# Patient Record
Sex: FEMALE | Race: BLACK OR AFRICAN AMERICAN | NOT HISPANIC OR LATINO | URBAN - METROPOLITAN AREA
[De-identification: names, ages, dates, MRNs, and addresses within clinical notes are randomized per-mention and may not be internally consistent; named-entity substitution may affect disease eponyms.]

---

## 2019-08-15 ENCOUNTER — OFFICE (OUTPATIENT)
Dept: URBAN - METROPOLITAN AREA CLINIC 101 | Facility: CLINIC | Age: 59
End: 2019-08-15

## 2019-08-15 VITALS
WEIGHT: 236 LBS | HEIGHT: 63 IN | TEMPERATURE: 97.7 F | DIASTOLIC BLOOD PRESSURE: 92 MMHG | SYSTOLIC BLOOD PRESSURE: 151 MMHG | HEART RATE: 79 BPM

## 2019-08-15 DIAGNOSIS — R10.31 RIGHT LOWER QUADRANT PAIN: ICD-10-CM

## 2019-08-15 PROCEDURE — 99244 OFF/OP CNSLTJ NEW/EST MOD 40: CPT

## 2019-08-15 NOTE — SERVICEHPINOTES
ISATU PERES   is a   59   year old    female who is being seen in consultation at the request of   CASEY REYES   for abdominal pain. Having RLQ pain which started years ago. Has been pretty constant and associated with movement. Seems to feel like a muscle knot/spasm worse when lying down at night or standing for long periods of time. She states she has been told it is kidney stones and saw nephrologist who told her she does not have stones. Pain does not worsen with BMs or eating. Has tried NSAIDs without relief. BMs are regular, once daily, BSS type 4. States she had a colonoscopy at [place] in March 2017 and it was normal--pain was occurring during this time as well. Recently (a month or so ago) had CT at [facility] as well but no report of this currently available to review. Abd u/s on 7/18 revealed right renal cyst, fatty liver, normal gallbladder. She denies any blood in the stool, family hx of GI cancer, heartburn. BRShe had spine surgery a year ago (L4-L5, L5-S1) and continues to have back pain. S/p hysterectomy. BRCT scan received after OV---CT with IV contrast on 5/29 (also had CT 12/2018, 11/2017) unremarkable aside from large (7.1 x 4.6cm) right renal cyst, unchanged from prior and nonobstructing bilateral renal stones (?)BR stated